# Patient Record
Sex: FEMALE | Race: WHITE | NOT HISPANIC OR LATINO | URBAN - NONMETROPOLITAN AREA
[De-identification: names, ages, dates, MRNs, and addresses within clinical notes are randomized per-mention and may not be internally consistent; named-entity substitution may affect disease eponyms.]

---

## 2018-01-30 ENCOUNTER — IMPORTED ENCOUNTER (OUTPATIENT)
Dept: URBAN - NONMETROPOLITAN AREA CLINIC 1 | Facility: CLINIC | Age: 53
End: 2018-01-30

## 2018-01-30 PROCEDURE — 92015 DETERMINE REFRACTIVE STATE: CPT

## 2018-01-30 PROCEDURE — 92014 COMPRE OPH EXAM EST PT 1/>: CPT

## 2018-01-30 NOTE — PATIENT DISCUSSION
Myopia/astig/presby-  educated patient on condition/refractive status-  new GLRx given today-  RTC 1 year complete or prnDES mild OU-  Discussed findings w/ pt today-  Advised to use Systane Balance OU BID PRN-  Monitor PRN; 's Notes: MR 1/30/2018DFE 1/30/2018

## 2019-02-01 ENCOUNTER — IMPORTED ENCOUNTER (OUTPATIENT)
Dept: URBAN - NONMETROPOLITAN AREA CLINIC 1 | Facility: CLINIC | Age: 54
End: 2019-02-01

## 2019-02-01 PROBLEM — H25.13: Noted: 2019-02-01

## 2019-02-01 PROCEDURE — 92014 COMPRE OPH EXAM EST PT 1/>: CPT

## 2019-02-01 PROCEDURE — 92015 DETERMINE REFRACTIVE STATE: CPT

## 2019-02-01 NOTE — PATIENT DISCUSSION
Astigmatism / Presbyopia OU- Discussed diagnosis in detail with patient - New glasses Rx given today- Continue to monitor- RTC 1 year complete GALEN mild OU- Discussed diagnosis in detail with patient- Discussed signs and symptoms of progression- Recommend patient drinking plenty of water and starting Omega 3’s - Recommend Refresh or Systane  throughout the day- Consider Restasis or plugs in the future if no improvement- Samples of Lumify for redness given and Refresh repair for dryness- [facility] Discussed diagnosis in detail with patient- Discussed signs and symptoms of progression- Discussed UV protection- No treatment needed at this time - Continue to monitor; 's Notes: MR 2/1/19DFE 2/1/19

## 2020-02-07 ENCOUNTER — IMPORTED ENCOUNTER (OUTPATIENT)
Dept: URBAN - NONMETROPOLITAN AREA CLINIC 1 | Facility: CLINIC | Age: 55
End: 2020-02-07

## 2020-02-07 PROCEDURE — 92014 COMPRE OPH EXAM EST PT 1/>: CPT

## 2020-02-07 PROCEDURE — 92015 DETERMINE REFRACTIVE STATE: CPT

## 2020-02-07 NOTE — PATIENT DISCUSSION
Astigmatism / Presbyopia OU- Discussed diagnosis in detail with patient - New glasses Rx given today- Continue to monitor- RTC 1 year complete GALEN mild OU- Discussed diagnosis in detail with patient- Discussed signs and symptoms of progression- Recommend patient drinking plenty of water and starting Omega 3’s - Recommend Refresh or Systane  throughout the day- Consider Restasis or plugs in the future if no improvement- Samples of Lumify for redness given and Refresh repair for dryness- [facility] Discussed diagnosis in detail with patient- Discussed signs and symptoms of progression- Discussed UV protection- No treatment needed at this time - Continue to monitor; 's Notes: MR 2/1/19DFE 2/1/19

## 2021-02-12 ENCOUNTER — IMPORTED ENCOUNTER (OUTPATIENT)
Dept: URBAN - NONMETROPOLITAN AREA CLINIC 1 | Facility: CLINIC | Age: 56
End: 2021-02-12

## 2021-02-12 PROBLEM — H25.013: Noted: 2021-02-12

## 2021-02-12 PROCEDURE — 92015 DETERMINE REFRACTIVE STATE: CPT

## 2021-02-12 PROCEDURE — 92014 COMPRE OPH EXAM EST PT 1/>: CPT

## 2021-02-12 NOTE — PATIENT DISCUSSION
Astigmatism / Presbyopia OU- Discussed diagnosis in detail with patient - New glasses Rx given today- Continue to monitor- RTC 1 year complete GALEN mild OU- Discussed diagnosis in detail with patient- Discussed signs and symptoms of progression- Recommend patient drinking plenty of water and starting Omega 3’s - Recommend Refresh or Systane  throughout the day- Consider Restasis or plugs in the future if no improvement- Samples of Lumify for redness given and Refresh repair for dryness- Continue to monitorCataracts OU- Discussed diagnosis in detail with patient- Discussed signs and symptoms of progression- Discussed UV protection- No treatment needed at this time - Continue to monitor; 's Notes: MR 2/1/19DFE 2/1/19

## 2022-02-12 ASSESSMENT — VISUAL ACUITY
OD_SC: 20/20
OU_CC: 20/20
OS_GLARE: 20/20
OD_GLARE: 20/20
OD_CC: 20/25
OS_CC: 20/25
OS_SC: 20/20

## 2022-02-12 ASSESSMENT — TONOMETRY
OS_IOP_MMHG: 13
OD_IOP_MMHG: 13

## 2022-02-18 ENCOUNTER — ESTABLISHED PATIENT (OUTPATIENT)
Dept: URBAN - NONMETROPOLITAN AREA CLINIC 1 | Facility: CLINIC | Age: 57
End: 2022-02-18

## 2022-02-18 DIAGNOSIS — H52.4: ICD-10-CM

## 2022-02-18 PROCEDURE — 92015 DETERMINE REFRACTIVE STATE: CPT

## 2022-02-18 PROCEDURE — 92014 COMPRE OPH EXAM EST PT 1/>: CPT

## 2022-02-18 ASSESSMENT — TONOMETRY
OS_IOP_MMHG: 15
OD_IOP_MMHG: 16

## 2022-02-18 ASSESSMENT — VISUAL ACUITY
OS_CC: J1+
OD_CC: 20/20-2
OD_CC: J1+
OS_CC: 20/25+2

## 2022-02-18 NOTE — PATIENT DISCUSSION
Discussed diagnosis in detail with patient. New glasses RX given today. Discussed CL fitting with patient with patient, patient may set up appointment with Al Negro when ready. $150.00 fitting, recommend mono vision or mutifocal. Possible toric. Continue to monitor.

## 2022-02-18 NOTE — PATIENT DISCUSSION
Discussed diagnosis in detail with patient. Discussed signs and symptoms of progression. Discussed UV protection. No treatment needed at this time.  Continue to monitor.

## 2022-03-28 ENCOUNTER — ESTABLISHED PATIENT (OUTPATIENT)
Dept: URBAN - NONMETROPOLITAN AREA CLINIC 1 | Facility: CLINIC | Age: 57
End: 2022-03-28

## 2022-03-28 DIAGNOSIS — H52.4: ICD-10-CM

## 2022-03-28 PROCEDURE — 92310-N CONTACT LENS FITTING NEW PATIENT

## 2022-03-28 ASSESSMENT — KERATOMETRY
OS_AXISANGLE_DEGREES: 172
OD_AXISANGLE2_DEGREES: 10
OS_K1POWER_DIOPTERS: 44.50
OD_K2POWER_DIOPTERS: 45.00
OS_K2POWER_DIOPTERS: 45.00
OS_AXISANGLE2_DEGREES: 82
OD_K1POWER_DIOPTERS: 44.25
OD_AXISANGLE_DEGREES: 100

## 2022-03-28 NOTE — PATIENT DISCUSSION
Discussed diagnosis in detail with patient. New glasses RX given today. Discussed CL fitting with patient with patient, patient may set up appointment with Khalida Littlejohn when ready. $150.00 fitting, recommend mono vision or mutifocal. Possible toric. Continue to monitor.

## 2022-04-09 ASSESSMENT — VISUAL ACUITY
OD_SC: 20/40+2
OD_SC: 20/20-2
OS_SC: 20/20
OD_PH: 20/22-1
OS_SC: 20/20-
OD_SC: 20/20-
OS_SC: 20/20

## 2022-04-09 ASSESSMENT — TONOMETRY
OD_IOP_MMHG: 14
OS_IOP_MMHG: 15
OD_IOP_MMHG: 14
OS_IOP_MMHG: 15
OD_IOP_MMHG: 15
OS_IOP_MMHG: 14

## 2022-04-13 ENCOUNTER — CONTACT LENSES/GLASSES VISIT (OUTPATIENT)
Dept: URBAN - NONMETROPOLITAN AREA CLINIC 1 | Facility: CLINIC | Age: 57
End: 2022-04-13

## 2022-04-13 DIAGNOSIS — H52.4: ICD-10-CM

## 2022-04-13 PROCEDURE — 92310-F CONTACT LENS FITTING FOLLOW UP

## 2022-04-13 ASSESSMENT — KERATOMETRY
OS_K2POWER_DIOPTERS: 45.00
OD_AXISANGLE_DEGREES: 100
OD_K1POWER_DIOPTERS: 44.25
OD_K2POWER_DIOPTERS: 45.00
OS_AXISANGLE_DEGREES: 172
OS_K1POWER_DIOPTERS: 44.50
OS_AXISANGLE2_DEGREES: 82
OD_AXISANGLE2_DEGREES: 10

## 2022-04-13 ASSESSMENT — VISUAL ACUITY
OD_CC: 20/50-2
OD_CC: 20/20
OS_CC: 20/20
OS_CC: 20/200

## 2022-04-13 NOTE — PATIENT DISCUSSION
Discussed diagnosis in detail with patient. New glasses RX given today. Discussed CL fitting with patient with patient, patient may set up appointment with Aileen Bansal when ready. $150.00 fitting, recommend mono vision or mutifocal. Possible toric. Continue to monitor.

## 2022-04-13 NOTE — PATIENT DISCUSSION
Poor near [place] with Dailies Total 1 Multifocal.  Refitted with Purevision 2 for Presbyopia.

## 2022-04-27 ENCOUNTER — CONTACT LENSES/GLASSES VISIT (OUTPATIENT)
Dept: URBAN - NONMETROPOLITAN AREA CLINIC 1 | Facility: CLINIC | Age: 57
End: 2022-04-27

## 2022-04-27 DIAGNOSIS — H25.013: ICD-10-CM

## 2022-04-27 PROCEDURE — 92012 INTRM OPH EXAM EST PATIENT: CPT

## 2022-04-27 ASSESSMENT — KERATOMETRY
OD_K2POWER_DIOPTERS: 45.00
OS_AXISANGLE_DEGREES: 172
OS_AXISANGLE2_DEGREES: 82
OD_AXISANGLE2_DEGREES: 10
OS_K2POWER_DIOPTERS: 45.00
OD_AXISANGLE_DEGREES: 100
OD_K1POWER_DIOPTERS: 44.25
OS_K1POWER_DIOPTERS: 44.50

## 2022-04-27 ASSESSMENT — VISUAL ACUITY
OD_PH: 20/25-1
OD_CC: 20/40 BLURRY
OS_CC: 20/30 BLURRY

## 2022-04-27 NOTE — PATIENT DISCUSSION
4/27/2022: New Trial (#4) given today of PureVision 2 for Presbyopia as noted:  +0.75 OD, high and +0.50 OS, high. Patient to return in 1-2 weeks for recheck.

## 2022-04-27 NOTE — PATIENT DISCUSSION
Discussed diagnosis in detail with patient. New glasses RX given today. Discussed CL fitting with patient with patient, patient may set up appointment with Brinda Ng when ready. $150.00 fitting, recommend mono vision or mutifocal. Possible toric. Continue to monitor.

## 2022-04-27 NOTE — PATIENT DISCUSSION
4/13/2022: Poor near [place] with Dailies Total 1 Multifocal.  Refitted with Purevision 2 for Presbyopia.

## 2022-05-11 ENCOUNTER — CONTACT LENSES/GLASSES VISIT (OUTPATIENT)
Dept: URBAN - NONMETROPOLITAN AREA CLINIC 1 | Facility: CLINIC | Age: 57
End: 2022-05-11

## 2022-05-11 DIAGNOSIS — H52.4: ICD-10-CM

## 2022-05-11 PROCEDURE — 99201 LIMITED PROBLEM FOCUSED - E/M: CPT

## 2022-05-11 ASSESSMENT — KERATOMETRY
OS_K1POWER_DIOPTERS: 44.50
OD_K2POWER_DIOPTERS: 45.00
OS_AXISANGLE_DEGREES: 172
OD_AXISANGLE_DEGREES: 100
OD_AXISANGLE2_DEGREES: 10
OS_AXISANGLE2_DEGREES: 82
OS_K2POWER_DIOPTERS: 45.00
OD_K1POWER_DIOPTERS: 44.25

## 2022-05-11 ASSESSMENT — VISUAL ACUITY
OD_CC: 20/200
OS_CC: 20/70-
OU_CC: 20/50

## 2022-05-11 NOTE — PATIENT DISCUSSION
Discussed diagnosis in detail with patient. New glasses RX given today. Discussed CL fitting with patient with patient, patient may set up appointment with Sam Dsouza when ready. $150.00 fitting, recommend mono vision or mutifocal. Possible toric. Continue to monitor.

## 2022-05-11 NOTE — PATIENT DISCUSSION
4/13/2022: Poor near [facility] with Dailies Total 1 Multifocal.  Refitted with Purevision 2 for Presbyopia.

## 2022-05-11 NOTE — PATIENT DISCUSSION
5/11/22: New Trial (#5) given today of PureVision 2 for Presbyopia as noted:  +0.25 OD, high and plano high OS,.

## 2024-03-01 ENCOUNTER — COMPREHENSIVE EXAM (OUTPATIENT)
Dept: URBAN - NONMETROPOLITAN AREA CLINIC 1 | Facility: CLINIC | Age: 59
End: 2024-03-01

## 2024-03-01 DIAGNOSIS — H52.4: ICD-10-CM

## 2024-03-01 PROCEDURE — 92015 DETERMINE REFRACTIVE STATE: CPT

## 2024-03-01 PROCEDURE — 92014 COMPRE OPH EXAM EST PT 1/>: CPT

## 2024-03-01 ASSESSMENT — KERATOMETRY
OS_AXISANGLE_DEGREES: 172
OS_AXISANGLE2_DEGREES: 82
OD_K1POWER_DIOPTERS: 44.25
OS_K1POWER_DIOPTERS: 44.50
OD_K2POWER_DIOPTERS: 45.00
OD_AXISANGLE_DEGREES: 100
OS_K2POWER_DIOPTERS: 45.00
OD_AXISANGLE2_DEGREES: 10

## 2024-03-01 ASSESSMENT — TONOMETRY
OS_IOP_MMHG: 16
OD_IOP_MMHG: 16

## 2024-03-01 ASSESSMENT — VISUAL ACUITY
OS_CC: 20/20
OU_CC: 20/20
OD_CC: 20/20